# Patient Record
Sex: FEMALE | Race: WHITE | NOT HISPANIC OR LATINO | Employment: FULL TIME | URBAN - METROPOLITAN AREA
[De-identification: names, ages, dates, MRNs, and addresses within clinical notes are randomized per-mention and may not be internally consistent; named-entity substitution may affect disease eponyms.]

---

## 2017-10-09 ENCOUNTER — AMBULATORY - RIVER FALLS (OUTPATIENT)
Dept: FAMILY MEDICINE | Facility: CLINIC | Age: 37
End: 2017-10-09

## 2018-10-18 ENCOUNTER — OFFICE VISIT - RIVER FALLS (OUTPATIENT)
Dept: FAMILY MEDICINE | Facility: CLINIC | Age: 38
End: 2018-10-18

## 2018-10-18 ASSESSMENT — MIFFLIN-ST. JEOR: SCORE: 1599.38

## 2018-10-22 ENCOUNTER — TRANSFERRED RECORDS (OUTPATIENT)
Dept: HEALTH INFORMATION MANAGEMENT | Facility: CLINIC | Age: 38
End: 2018-10-22

## 2018-10-22 LAB — HPV ABSTRACT: NORMAL

## 2019-05-23 ENCOUNTER — COMMUNICATION - RIVER FALLS (OUTPATIENT)
Dept: FAMILY MEDICINE | Facility: CLINIC | Age: 39
End: 2019-05-23

## 2019-05-23 ENCOUNTER — AMBULATORY - RIVER FALLS (OUTPATIENT)
Dept: FAMILY MEDICINE | Facility: CLINIC | Age: 39
End: 2019-05-23

## 2019-05-28 LAB
GAMMA INTERFERON BACKGROUND BLD IA-ACNC: 0.06 IU/ML
HBV SURFACE AB SERPL IA-ACNC: 40 MIU/ML
M TB IFN-G BLD-IMP: NEGATIVE
M TB IFN-G CD4+ BCKGRND COR BLD-ACNC: 9.65 IU/ML
MITOGEN IGNF BCKGRD COR BLD-ACNC: 0 IU/ML
MITOGEN IGNF BCKGRD COR BLD-ACNC: <0 IU/ML
VZV IGG SER QL IA: 2196

## 2019-10-24 ENCOUNTER — AMBULATORY - RIVER FALLS (OUTPATIENT)
Dept: FAMILY MEDICINE | Facility: CLINIC | Age: 39
End: 2019-10-24

## 2020-04-16 ENCOUNTER — OFFICE VISIT - RIVER FALLS (OUTPATIENT)
Dept: FAMILY MEDICINE | Facility: CLINIC | Age: 40
End: 2020-04-16

## 2020-04-16 ASSESSMENT — MIFFLIN-ST. JEOR: SCORE: 1808.04

## 2020-07-21 ENCOUNTER — OFFICE VISIT - RIVER FALLS (OUTPATIENT)
Dept: FAMILY MEDICINE | Facility: CLINIC | Age: 40
End: 2020-07-21

## 2020-07-21 ASSESSMENT — MIFFLIN-ST. JEOR: SCORE: 1742.72

## 2022-01-09 ENCOUNTER — LAB REQUISITION (OUTPATIENT)
Dept: LAB | Facility: CLINIC | Age: 42
End: 2022-01-09
Payer: COMMERCIAL

## 2022-01-09 DIAGNOSIS — U07.1 COVID-19: ICD-10-CM

## 2022-01-09 PROCEDURE — U0003 INFECTIOUS AGENT DETECTION BY NUCLEIC ACID (DNA OR RNA); SEVERE ACUTE RESPIRATORY SYNDROME CORONAVIRUS 2 (SARS-COV-2) (CORONAVIRUS DISEASE [COVID-19]), AMPLIFIED PROBE TECHNIQUE, MAKING USE OF HIGH THROUGHPUT TECHNOLOGIES AS DESCRIBED BY CMS-2020-01-R: HCPCS | Mod: ORL | Performed by: FAMILY MEDICINE

## 2022-01-10 LAB
SARS-COV-2 RNA RESP QL NAA+PROBE: NEGATIVE
SARS-COV-2 RNA RESP QL NAA+PROBE: NEGATIVE

## 2022-01-12 ENCOUNTER — LAB REQUISITION (OUTPATIENT)
Dept: LAB | Facility: CLINIC | Age: 42
End: 2022-01-12
Payer: COMMERCIAL

## 2022-01-12 DIAGNOSIS — U07.1 COVID-19: ICD-10-CM

## 2022-01-12 PROCEDURE — U0005 INFEC AGEN DETEC AMPLI PROBE: HCPCS | Mod: ORL | Performed by: FAMILY MEDICINE

## 2022-01-13 LAB — SARS-COV-2 RNA RESP QL NAA+PROBE: POSITIVE

## 2022-01-18 LAB — SARS-COV-2 RNA RESP QL NAA+PROBE: POSITIVE

## 2022-02-11 VITALS
BODY MASS INDEX: 38.42 KG/M2 | TEMPERATURE: 97.8 F | WEIGHT: 244.8 LBS | HEART RATE: 76 BPM | DIASTOLIC BLOOD PRESSURE: 80 MMHG | SYSTOLIC BLOOD PRESSURE: 110 MMHG | HEIGHT: 67 IN

## 2022-02-12 VITALS
HEIGHT: 67 IN | WEIGHT: 230.4 LBS | BODY MASS INDEX: 36.16 KG/M2 | TEMPERATURE: 98.4 F | DIASTOLIC BLOOD PRESSURE: 70 MMHG | OXYGEN SATURATION: 98 % | HEART RATE: 81 BPM | SYSTOLIC BLOOD PRESSURE: 104 MMHG

## 2022-02-12 VITALS
HEART RATE: 76 BPM | HEIGHT: 67 IN | DIASTOLIC BLOOD PRESSURE: 62 MMHG | BODY MASS INDEX: 31.2 KG/M2 | TEMPERATURE: 98.7 F | SYSTOLIC BLOOD PRESSURE: 102 MMHG | WEIGHT: 198.8 LBS

## 2022-02-16 NOTE — NURSING NOTE
Depression Screening Entered On:  7/22/2020 3:10 PM CDT    Performed On:  7/21/2020 3:09 PM CDT by Niki COBB, Marixa               Depression Screening   Little Interest - Pleasure in Activities :   Not at all   Feeling Down, Depressed, Hopeless :   Not at all   Initial Depression Screen Score :   0 Score   Poor Appetite or Overeating :   Several days   Trouble Falling or Staying Asleep :   Not at all   Feeling Tired or Little Energy :   Several days   Feeling Bad About Yourself :   Not at all   Trouble Concentrating :   Not at all   Moving or Speaking Slowly :   Not at all   Thoughts Better Off Dead or Hurting Self :   Not at all   GRAHAM Difficulty with Work, Home, Others :   Not difficult at all   Detailed Depression Screen Score :   2    Total Depression Screen Score :   2    Marixa Prince MA - 7/22/2020 3:09 PM CDT

## 2022-02-16 NOTE — NURSING NOTE
CAGE Assessment Entered On:  7/22/2020 3:09 PM CDT    Performed On:  7/21/2020 3:09 PM CDT by Marixa Prince MA               Assessment   Have you ever felt you should cut down on your drinking :   No   Have people annoyed you by criticizing your drinking :   No   Have you ever felt bad or guilty about your drinking :   No   Have you ever taken a drink first thing in the morning to steady your nerves or get rid of a hangover (Eye-opener) :   No   CAGE Score :   0    Marixa Prince MA - 7/22/2020 3:09 PM CDT

## 2022-02-16 NOTE — PROGRESS NOTES
Chief Complaint    f/u wt loss, phentermine.  History of Present Illness      Patient is here for follow-up on weight loss.  She has been on phentermine for the last couple of months.  She has lost about 15 pounds.  He has had some trouble recently with plateauing of the weight.  She currently is on 30 mg daily.  Her appetite has been fairly well suppressed.  She admits that her eating habits are not as good as he should be.  Denies any side effects from medication.  Blood pressure is been normal.  Review of Systems      See HPI.  All other review of systems negative.  Physical Exam   Vitals & Measurements    T: 98.4   F (Tympanic)  HR: 81(Peripheral)  BP: 104/70  SpO2: 98%     HT: 67 in  WT: 230.4 lb  BMI: 36.08       Alert, oriented, no acute distress       Normal heart rate       Nonlabored breathing       Cooperative, appropriate affect  Assessment/Plan       Obesity (Probable) (E66.9)         Encourage visit with dietitian.  Discussed portion size, appropriate eating habits, appetite management, and future oral medication.  She will stay on her current dose of phentermine and report back in about 1 month regarding her weight.  She is unable to continue losing weight consider changing medication or discontinuing the medication.       Orders:         phentermine, = 1 cap(s) ( 30 mg ), Oral, daily, # 30 cap(s), 0 Refill(s), Type: Maintenance, Pharmacy: [facility], 1 cap(s) Oral daily, 67, in, 07/21/20 15:49:00 CDT, Height Measured, 230.4, lb, 07/21/20 15:49:00 CDT, Weight Measured, (Ordered)  Patient Information     Name:CRISTHIAN MCMULLEN      Address:      [address] [place] [id number]     Sex:Female     YOB: 1980     [phone]     Emergency Contact:KLARISSA MCMULLEN     MRN:[id number]     FIN:[id number]     Location:[facility]     Date of Service:07/21/2020      Primary Care Physician:       Greg Rodriguez MD, [phone]      Attending Physician:       Greg Rodriguez MD, [phone]  Problem List/Past Medical History    Ongoing     Obesity    Historical     Chicken Pox     Pregnancy     Pregnancy     Pregnancy  Procedure/Surgical History     Exploratory Laparotomy (03/21/2013)      Comments: 1.  Retrieval of intra-abdominal IUD.      2.  Sigmoidoscopy - normal to 30 cm..     NVD (Normal Vaginal Delivery) (10/18/2011)      Comments: Female - 39w5d..     vaginal delivery x2      Comments: 2005,2008.  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 3-4 times/week Exercise frequency:. 21 day fix Exercise type:., 10/18/2018     Home/Environment      Marital status: . Spouse/Partner name: Ford. Lives with Children, Spouse., 03/07/2013     Substance Abuse      Never, 03/07/2013     Tobacco      Past, 03/07/2013  Family History    Asthma: Mother.    Sister: History is negative  Immunizations      Vaccine Date Status          influenza virus vaccine, inactivated 10/24/2019 Given          tetanus/diphth/pertuss (Tdap) adult/adol 10/18/2018 Given          influenza virus vaccine, inactivated 10/18/2018 Given          influenza virus vaccine, inactivated 10/09/2017 Given          influenza virus vaccine, inactivated 10/12/2016 Given          influenza virus vaccine, inactivated 10/13/2015 Given          influenza virus vaccine, inactivated 10/14/2014 Given          influenza virus vaccine, inactivated 10/08/2013 Given          influenza virus vaccine, inactivated 10/23/2012 Given          influenza virus vaccine, inactivated 09/20/2011 Given          influenza virus vaccine, inactivated  10/13/2010 Given          influenza virus vaccine, inactivated 09/14/2009 Recorded          influenza virus vaccine, inactivated 10/17/2008 Recorded          tetanus/diphth/pertuss (Tdap) adult/adol 02/21/2007 Recorded          tetanus/diphth/pertuss (Tdap) adult/adol 02/12/2007 Recorded          influenza virus vaccine, inactivated 10/19/2006 Recorded          influenza virus vaccine, inactivated 11/22/2005 Recorded          Hep B 02/21/2001 Recorded          MMR (measles/mumps/rubella) 08/17/2000 Recorded          Hep B 08/08/2000 Recorded          Hep B 06/27/2000 Recorded          MMR (measles/mumps/rubella) 04/04/1991 Recorded          MMR (measles/mumps/rubella) 10/11/1982 Recorded

## 2022-02-16 NOTE — TELEPHONE ENCOUNTER
---------------------  From: Niki COBB, Marixa ([employer] Pool ([id number]_[facility]))   To: Greg Rodriguez MD;     Sent: 10/21/2020 7:29:19 AM CDT  Subject: General Message     Pt ran out of phentermine last week.  Felt fine until yesterday--drowsy, dizzy/vertigo spells.  Pt is wondering if she should restart med or just stop since she already has been without med x4days.  She was taking 15mg bid but would forget the PM dose.  If she is to continue, please send in 30mg daily dose.  Please advise if ok to be off med or resume.---------------------  From: Greg Rodriguez MD   To: CRISTHIAN MCMULLEN    Sent: 10/21/2020 3:51:22 PM CDT  Subject: RE: General Message     OK to stop the medication especially if no longer losing weight.  If weight loss has continued can continue at 30 mg qd.

## 2022-02-16 NOTE — TELEPHONE ENCOUNTER
---------------------  From: Niki COBB, Marixa ([employer] Message Pool ([id number]_[facility]))   To: Greg Rodriguez MD;     Sent: 5/13/2020 10:29:31 AM CDT  Subject: General Message     Pt wanting to give update on new medication.  Is doing well on med, lost about 12lbs from last visit.  Wt now 232lbs.  She does check wt daily and logging on her calendar.  Wondering if she should be on the same dose or have it adjusted, is due for refill by Friday.  Please advise.  ** Submitted: **  Order:phentermine (phentermine 15 mg oral capsule)  1 cap(s)  Oral  daily  Qty:  30 cap(s)        Duration:  30 day(s)        Refills:  0          Substitutions Allowed     Route To Pharmacy - [facility]    Signed by Greg Rodriguez MD  5/14/2020 4:32:00 PM

## 2022-02-16 NOTE — PROCEDURES
Accession Number:       [id number]  CLINICAL INFORMATION::     None given  LMP::     09/22/2018  PREV. PAP::     05/2016  PREV. BX::     NONE GIVEN  SOURCE::     Cervix, Endocervix  STATEMENT OF ADEQUACY::     Satisfactory for evaluation. Endocervical/transformation zone component present.  INTERPRETATION/RESULT::     Negative for intraepithelial lesion or malignancy.  COMMENT::     This Pap test has been evaluated with computer assisted technology.  CYTOTECHNOLOGIST::     DIMAS DEE(ASCP) CT Screening location: [place] [address]  COMMENT:     See comment       EXPLANATORY NOTE:         The Pap is a screening test for cervical cancer. It is       not a diagnostic test and is subject to false negative       and false positive results. It is most reliable when a       satisfactory sample, regularly obtained, is submitted       with relevant clinical findings and history, and when       the Pap result is evaluated along with historic and       current clinical information.  HPV mRNA E6/E7:     Not Detected       This test was performed using the APTIMA HPV Assay ([employer]       This assay detects E6/E7 viral messenger RNA (mRNA) from 14       high-risk HPV types (16,18,31,33,35,39,45,51,52,56,58,59,66,68).         The analytical performance characteristics of       this assay have been determined by [employer]. The modifications have not been       cleared or approved by the FDA. This assay has       been validated pursuant to the CLIA regulations       and is used for clinical purposes.

## 2022-02-16 NOTE — NURSING NOTE
Comprehensive Intake Entered On:  4/16/2020 3:06 PM CDT    Performed On:  4/16/2020 3:01 PM CDT by Niki COBB, Marixa               Summary   Chief Complaint :   discuss wt issues, feels more bloated, increased fatigue.   Menstrual Status :   Menarcheal   Weight Measured :   244.8 lb(Converted to: 244 lb 13 oz, 111.04 kg)    Height Measured :   67 in(Converted to: 5 ft 7 in, 170.18 cm)    Body Mass Index :   38.34 kg/m2 (HI)    Body Surface Area :   2.29 m2   Systolic Blood Pressure :   110 mmHg   Diastolic Blood Pressure :   80 mmHg   Mean Arterial Pressure :   90 mmHg   Peripheral Pulse Rate :   76 bpm   BP Site :   Right arm   Pulse Site :   Radial artery   BP Method :   Manual   HR Method :   Manual   Temperature Tympanic :   97.8 DegF(Converted to: 36.6 DegC)  (LOW)    Marixa Prince MA - 4/16/2020 3:01 PM CDT   Health Status   Allergies Verified? :   Yes   Medication History Verified? :   Yes   Medical History Verified? :   Yes   Pre-Visit Planning Status :   Completed   Tobacco Use? :   Former smoker   Marixa Prince MA - 4/16/2020 3:01 PM CDT   Consents   Consent for Immunization Exchange :   Consent Granted   Consent for Immunizations to Providers :   Consent Granted   Marixa Prince MA - 4/16/2020 3:01 PM CDT   Meds / Allergies   (As Of: 4/16/2020 3:06:24 PM CDT)   Allergies (Active)   No Known Medication Allergies  Estimated Onset Date:   Unspecified ; Created By:   Cory Jenkins CMA; Reaction Status:   Active ; Category:   Drug ; Substance:   No Known Medication Allergies ; Type:   Allergy ; Updated By:   Cory Jenkins CMA; Reviewed Date:   11/28/2016 10:39 AM CST        Medication List   (As Of: 4/16/2020 3:06:24 PM CDT)   No Known Home Medications     Marixa Prince MA - 4/16/2020 3:04:32 PM      Prescription/Discharge Order    Gildess FE 1/20 oral tablet  :   Gildess FE 1/20 oral tablet ; Status:   Completed ; Ordered As Mnemonic:   Gildess FE 1/20 oral tablet ; Simple Display Line:   See  Instructions, TAKE ONE TABLET BY MOUTH ONE TIME DAILY, 84 tab(s), 2 Refill(s) ; Ordering Provider:   Greg Rodriguez MD; Catalog Code:   ethinyl estradiol-norethindrone ; Order Dt/Tm:   7/12/2016 1:52:37 PM CDT            Social History   Social History   (As Of: 4/16/2020 3:06:24 PM CDT)   Alcohol:        Current, 1-2 times per month   (Last Updated: 3/7/2013 11:25:05 AM CST by Marixa Prince MA)          Tobacco:        Past   (Last Updated: 3/7/2013 11:25:14 AM CST by Marixa Prince MA)          Substance Abuse:        Never   (Last Updated: 3/7/2013 11:25:11 AM CST by Marixa Prince MA)          Employment/School:        Employed, 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   (Last Updated: 3/7/2013 11:25:45 AM CST by Marixa Prince MA)          Home/Environment:        Marital status: .  Spouse/Partner name: Ford.  Lives with Children, Spouse.   (Last Updated: 3/7/2013 11:25:28 AM CST by Marixa Prince MA)          Exercise:        30 Exercise duration:.  3-4 times/week Exercise frequency:.  21 day fix Exercise type:.   (Last Updated: 10/18/2018 5:07:40 PM UTC by Marixa Prince MA)

## 2022-02-16 NOTE — TELEPHONE ENCOUNTER
---------------------  From: Niki COBB, Marixa ([facility] Message Pool ([id number]_[facility]))   To: Greg Rodriguez MD;     Sent: 8/19/2020 2:41:03 PM CDT  Subject: Phentermine refill     Phone Message    PCP:   ELLEN      Time of Call:  1400       Person Calling:  pt  Phone number:  [phone]    Reason for call:  pt requesting Phentermine refill.  Is doing well on current dose, feeling full faster.  Is wondering if she should continue current dose, 30mg qAM or try 15mg bid to see if it helps w/ evening meal.  Current wt:  222.1lbs.  Returned call at: _    Note:   _    Last office visit and reason:  _    Transferred to: _  ** Submitted: **  Order:phentermine (phentermine 15 mg oral capsule)  1 cap(s)  Oral  bid  Qty:  60 cap(s)        Duration:  30 day(s)        Refills:  0          Substitutions Allowed     Route To Pharmacy - [facility]    Signed by Greg Rodriguez MD  8/19/2020 9:31:00 PM [facility].  She has a total of 22 lbs since starting therapy.  Change to 15 mg bid to help with appetite control in evening.

## 2022-02-16 NOTE — PROGRESS NOTES
Chief Complaint    discuss wt issues, feels more bloated, increased fatigue.  History of Present Illness      Patient is here to discuss weight issues.  Over the last 18 months he has gained about 40 pounds.  She is considering medication to help her with weight loss.  Her biggest challenges with regard to her weight are intermittent eating.  Her family is busy with sports and they often eat while they are out.  They go to fast food restaurants fairly often.  She finds her willpower did not eat is very poor.  She is becoming quite discouraged.       She has participated in boot camp and beach body exercise regimens without a lot of success with weight loss.  She has tried intermittent fasting and did not do well with this.  She describes symptoms consistent with hypoglycemia.  She is also tried a low-carb diet and keto diet with out much success.  She has used weight watchers patrick and my fitness pal patrick in the past with some success.       She has not had any troubles with hypertension.  Her menses are normal.  She did not have gestational diabetes with her pregnancies.  Review of Systems      Constitutional: No fever, No chills, No sweats, No weakness, No fatigue.      Eye: No recent visual problem.      Ear/Nose/Mouth/Throat: No decreased hearing, No nasal congestion, No sore throat.      Respiratory: No shortness of breath, No cough.      Cardiovascular: No chest pain, No palpitations, No peripheral edema.      Gastrointestinal: No nausea, No vomiting, No diarrhea, No constipation, No heartburn.      Genitourinary: No dysuria, No change in urine stream.      Hematology/Lymphatics: No bruising tendency, No bleeding tendency.      Endocrine: No cold intolerance, No heat intolerance.      Musculoskeletal: No back pain, No neck pain, No joint pain, No muscle pain.      Integumentary: No rash.      Neurologic: Alert and oriented X4, No headache.      Psychiatric: No anxiety, No depression.      All other systems were  reviewed and are negative.  Physical Exam   Vitals & Measurements    T: 97.8   F (Tympanic)  HR: 76(Peripheral)  BP: 110/80     HT: 67 in  WT: 244.8 lb  BMI: 38.34       General: Alert and oriented, No acute distress.      Eye: Pupils are equal, round and reactive to light, Normal conjunctiva.      HENT: Oral mucosa is moist.      Neck: Supple.      Respiratory: Respirations are non-labored.      Cardiovascular: Normal rate, Regular rhythm, No edema.      Gastrointestinal: Non-distended.      Musculoskeletal: Normal gait.      Integumentary: Warm, No rash.      Psychiatric: Cooperative, Appropriate mood & affect, Normal judgment  Assessment/Plan       Obesity (Probable) (E66.9)        We have discussed various weight loss plans.  Discussed the importance of proper diet and weight loss.  Calorie restriction and appetite management were discussed.  She will start on phentermine 15 mg daily.  Recheck in 1 month.  We have set a goal of 25 pounds over the next several months.  Side effects medication discussed.  Type and dosing discussed as well.  Follow-up in 1 month.         30 minutes spent with the patient majority in counseling discussion and care coordination       Orders:         ethinyl estradiol-norethindrone, See Instructions, Instructions: TAKE ONE TABLET BY MOUTH ONE TIME DAILY, # 84 tab(s), 2 Refill(s), Type: Soft Stop, Pharmacy: [id number], TAKE ONE TABLET BY MOUTH ONE TIME DAILY, (Completed)  Patient Information     Name:CRISTHIAN MCMULLEN      Address:      [address] [place] [id number]     Sex:Female     YOB: 1980     [phone]     Emergency Contact:KLARISSA MCMULLEN     MRN:[id number]     FIN:[id number]     Location:[facility]     Date of Service:04/16/2020      Primary Care Physician:       Greg Rodriguez MD, [phone]      Attending Physician:       Greg Rodriguez MD, [phone]  Problem List/Past Medical History     Ongoing     Obesity    Historical     Chicken Pox     Pregnancy     Pregnancy     Pregnancy  Procedure/Surgical History     Exploratory Laparotomy (03/21/2013)      Comments: 1.  Retrieval of intra-abdominal IUD.      2.  Sigmoidoscopy - normal to 30 cm..     NVD (Normal Vaginal Delivery) (10/18/2011)      Comments: Female - 39w5d..     vaginal delivery x2      Comments: 2005,2008.  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 3-4 times/week Exercise frequency:. 21 day fix Exercise type:., 10/18/2018     Home/Environment      Marital status: . Spouse/Partner name: Ford. Lives with Children, Spouse., 03/07/2013     Substance Abuse      Never, 03/07/2013     Tobacco      Past, 03/07/2013  Family History    Asthma: Mother.    Sister: History is negative  Immunizations      Vaccine Date Status          influenza virus vaccine, inactivated 10/24/2019 Given          tetanus/diphth/pertuss (Tdap) adult/adol 10/18/2018 Given          influenza virus vaccine, inactivated 10/18/2018 Given          influenza virus vaccine, inactivated 10/09/2017 Given          influenza virus vaccine, inactivated 10/12/2016 Given          influenza virus vaccine, inactivated 10/13/2015 Given          influenza virus vaccine, inactivated 10/14/2014 Given          influenza virus vaccine, inactivated 10/08/2013 Given          influenza virus vaccine, inactivated 10/23/2012 Given          influenza virus vaccine, inactivated 09/20/2011 Given          influenza virus vaccine, inactivated 10/13/2010 Given          influenza virus vaccine, inactivated 09/14/2009 Recorded          influenza virus vaccine, inactivated 10/17/2008 Recorded           tetanus/diphth/pertuss (Tdap) adult/adol 02/21/2007 Recorded          tetanus/diphth/pertuss (Tdap) adult/adol 02/12/2007 Recorded          influenza virus vaccine, inactivated 10/19/2006 Recorded          influenza virus vaccine, inactivated 11/22/2005 Recorded          Hep B 02/21/2001 Recorded          MMR (measles/mumps/rubella) 08/17/2000 Recorded          Hep B 08/08/2000 Recorded          Hep B 06/27/2000 Recorded          MMR (measles/mumps/rubella) 04/04/1991 Recorded          MMR (measles/mumps/rubella) 10/11/1982 Recorded

## 2022-02-16 NOTE — TELEPHONE ENCOUNTER
---------------------  From: Niki COBB, Marixa ([employer] Pool ([id number]_[facility]))   To: Greg Rodriguez MD;     Sent: 6/11/2020 8:00:31 AM CDT  Subject: Med concerns--Phentermine     Pt is needing refill of Phentermine by Thursday.  Wt today:  232lbs, only down 10lbs over the past 2 months, no changes.  She isn't feeling any effects from med, is still feeling hungry on current dose.  Wondering if dose should be increased.  Please advise.  ** Submitted: **  Order:phentermine (phentermine 30 mg oral capsule)  1 cap(s)  Oral  daily  Qty:  30 cap(s)        Duration:  30 day(s)        Refills:  0          Substitutions Allowed     Route To Pharmacy - [facility]    Signed by Greg Rodriguez MD  6/11/2020 1:35:00 PM---------------------  From: Greg Rodriguez MD   To: [employer] Pool ([id number]_[place] [facility] [place]);     Sent: 6/11/2020 8:37:15 AM CDT  Subject: RE: Med concerns--Phentermine     Phentermine increased to 30 mg a day.  Follow up in one monthPt informed.

## 2022-02-16 NOTE — TELEPHONE ENCOUNTER
---------------------  From: Carli Nunez CMA   Sent: 11/17/2020 1:13:09 PM CST  Subject: COVID results     COVID19 negative per state testing done on 11/11/2020, pt was notified.

## 2022-02-16 NOTE — NURSING NOTE
Comprehensive Intake Entered On:  7/21/2020 3:55 PM CDT    Performed On:  7/21/2020 3:49 PM CDT by Niki COBB, Marixa               Summary   Chief Complaint :   f/u wt loss, phentermine.   Menstrual Status :   Menarcheal   Weight Measured :   230.4 lb(Converted to: 230 lb 6 oz, 104.51 kg)    Height Measured :   67 in(Converted to: 5 ft 7 in, 170.18 cm)    Body Mass Index :   36.08 kg/m2 (HI)    Body Surface Area :   2.22 m2   Systolic Blood Pressure :   104 mmHg   Diastolic Blood Pressure :   70 mmHg   Mean Arterial Pressure :   81 mmHg   Peripheral Pulse Rate :   81 bpm   BP Site :   Right arm   Pulse Site :   Radial artery   BP Method :   Manual   HR Method :   Manual   Temperature Tympanic :   98.4 DegF(Converted to: 36.9 DegC)    Oxygen Saturation :   98 %   Marixa Prince MA - 7/21/2020 3:49 PM CDT   Health Status   Allergies Verified? :   Yes   Medication History Verified? :   Yes   Medical History Verified? :   Yes   Pre-Visit Planning Status :   Completed   Tobacco Use? :   Former smoker   Marixa Prince MA - 7/21/2020 3:49 PM CDT   Consents   Consent for Immunization Exchange :   Consent Granted   Consent for Immunizations to Providers :   Consent Granted   Marixa Prince MA - 7/21/2020 3:49 PM CDT   Meds / Allergies   (As Of: 7/21/2020 3:55:20 PM CDT)   Allergies (Active)   No Known Medication Allergies  Estimated Onset Date:   Unspecified ; Created By:   Cory Jenkins CMA; Reaction Status:   Active ; Category:   Drug ; Substance:   No Known Medication Allergies ; Type:   Allergy ; Updated By:   Cory Jenkins CMA; Reviewed Date:   11/28/2016 10:39 AM CST        Medication List   (As Of: 7/21/2020 3:55:20 PM CDT)   Home Meds    phentermine  :   phentermine ; Status:   Documented ; Ordered As Mnemonic:   phentermine 30 mg oral capsule ; Simple Display Line:   30 mg, 1 cap(s), Oral, daily, 0 Refill(s) ; Catalog Code:   phentermine ; Order Dt/Tm:   7/21/2020 11:31:58 AM CDT            ID Risk  Screen   Recent Travel History :   No recent travel   Family Member Travel History :   No recent travel   Other Exposure to Infectious Disease :   Unknown   Marixa Prince MA - 7/21/2020 3:49 PM CDT   Social History   Social History   (As Of: 7/21/2020 3:55:20 PM CDT)   Alcohol:        Current, 1-2 times per month   (Last Updated: 3/7/2013 11:25:05 AM CST by Marixa Prince MA)          Tobacco:        Past   (Last Updated: 3/7/2013 11:25:14 AM CST by Marixa Prince MA)          Substance Abuse:        Never   (Last Updated: 3/7/2013 11:25:11 AM CST by Marixa Prince MA)          Employment/School:        Employed, 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   (Last Updated: 3/7/2013 11:25:45 AM CST by Marixa Prince MA)          Home/Environment:        Marital status: .  Spouse/Partner name: Ford.  Lives with Children, Spouse.   (Last Updated: 3/7/2013 11:25:28 AM CST by Marixa Prince MA)          Exercise:        30 Exercise duration:.  3-4 times/week Exercise frequency:.  21 day fix Exercise type:.   (Last Updated: 10/18/2018 5:07:40 PM UTC by Marixa Prince MA)

## 2022-02-16 NOTE — PROGRESS NOTES
Chief Complaint    annual px, discuss wt gain, lack of energy over the past yr. flu shot also.  History of Present Illness       Patient is here for her annual pap/px.  She has concerns about her increased weight gain and retaining fluid over the past year.  She has gained over 20lbs over the past year, stopped taking OCP as well.  She is limiting in intake of high calorie soft drinks, drinking more water.  She is also watching her diet, trying not to eat too many sweets.  Her menses are heavier but still lasts 7 days.  She also c/o having a foul odor during her menses.         Weight one year ago 175lbs.  Today s weight 198.8 lbs.          Last Pap:  May 2014---due          Hx of abnormal paps:  no             Most recent mammogram:  n/a          Colon cancer screening status:  n/a          Dexa scan:  n/a             Last Dental Exam:  UTD          Last Eye Exam: no vision issues          Immunizations:  Needs flu shot. and Tdap today          Screening labs:  UTD   Review of Systems      Constitutional:  No fever, No chills, No sweats, No weakness, No fatigue.            Eye:  No recent visual problem.            Ear/Nose/Mouth/Throat:  No decreased hearing, No nasal congestion, No sore throat.            Respiratory:  No shortness of breath, No cough.            Cardiovascular:  No chest pain, No palpitations, No peripheral edema.            Gastrointestinal:  No nausea, No vomiting, No diarrhea, No constipation, No heartburn.            Genitourinary:  No dysuria, No change in urine stream.            Hematology/Lymphatics:  No bruising tendency, No bleeding tendency.            Endocrine:  No cold intolerance, No heat intolerance.            Musculoskeletal:  No back pain, No neck pain, No joint pain, No muscle pain.            Integumentary:  No rash.            Neurologic:  Alert and oriented X4, No headache.            Psychiatric:  No anxiety, No depression.            All other systems were reviewed and  are negative         Physical Exam   Vitals & Measurements    T: 98.7   F (Tympanic)  HR: 76(Peripheral)  BP: 102/62     HT: 67 in  WT: 198.8 lb  BMI: 31.13       General: Alert and oriented, No acute distress.      Eye: Pupils are equal, round and reactive to light, Extraocular movements are intact, Normal conjunctiva.      HENT: Normocephalic, Tympanic membranes are clear, Oral mucosa is moist, No pharyngeal erythema, No sinus tenderness.      Neck: Supple, Non-tender, No carotid bruit, No lymphadenopathy, No thyromegaly.      Respiratory: Lungs are clear to auscultation, Respirations are non-labored, Breath sounds are equal, No chest wall tenderness.      Cardiovascular: Normal rate, Regular rhythm, No murmur, No gallop, Normal peripheral perfusion, No edema.      Breast: No mass, No tenderness, No discharge.      Gastrointestinal: Soft, Non-tender, Normal bowel sounds, No organomegaly.      Genitourinary: No costovertebral angle tenderness.      Labia: Within normal limits.      Urethra: Within normal limits.      Vagina: white discharge      Cervix: parous, no lesions or discharge      Musculoskeletal: Normal range of motion, Normal strength, No swelling, No deformity.      Integumentary: Warm, Dry, No rash, No new skin moles.      Neurologic: Alert, Oriented, Normal sensory, Normal motor function, No focal deficits, Normal deep tendon reflexes.      Psychiatric: Cooperative, Appropriate mood & affect      wet prep is negative  Assessment/Plan       1. Encounter for well woman exam with routine gynecological exam (Z01.419)        Patient will continue to watch diet and maintain healthy eating habits, increase activity/exercise level.  Thin prep is done today.  If normal, she will repeat pap in 5 yrs.  RTC in 1yr for physical.  Flu shot and Tdap given today.       2. Heavy menses (N92.0)         Will order Hgb to check for anemia as well as cause of fatigue and a TSH to see if she is having issues with her  thyroid that could explain why she is having heavier than normal menses as well as fatigue, weight gain.       3. Vaginal odor (N89.8)         Wet prep to be done to r/o possible vaginal infection.      I, Marixa Prince MA, acted solely as a scribe for, and in the presence of Dr. Greg Rodriguez who performed the services.             I, Greg Rodriguez MD, personally performed the services described in this documentation.  The documentation was scribed in my presence and is both accurate and complete.                Patient Information     Name:CRISTHIAN MCMULLEN      Address:      [address] [place] [id number]     Sex:Female     YOB: 1980     [phone]     Emergency Contact:KLARISSA MCMULLEN     MRN:[id number]     FIN:[id number]     Location:[facility]     Date of Service:10/18/2018      Primary Care Physician:       Greg Rodriguez MD, [phone]      Attending Physician:       Greg Rodriguez MD, [phone]  Problem List/Past Medical History    Ongoing     Obesity    Historical     Chicken Pox     Pregnancy     Pregnancy     Pregnancy  Procedure/Surgical History     Exploratory Laparotomy (03/21/2013)            Comments:      1.  Retrieval of intra-abdominal IUD.      2.  Sigmoidoscopy - normal to 30 cm.     NVD (Normal Vaginal Delivery) (10/18/2011)            Comments:      Female - 39w5d.     vaginal delivery x2            Comments:      2005,2008  Medications     Gildess FE 1/20 oral tablet: See Instructions, TAKE ONE TABLET BY MOUTH ONE TIME DAILY, 84 tab(s), 2 Refill(s).          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 3-4 times/week Exercise  frequency:. 21 day fix Exercise type:., 10/18/2018     Home and Environment      Marital status: . Spouse/Partner name: Ford. Lives with Children, Spouse., 03/07/2013     Substance Abuse      Never, 03/07/2013     Tobacco      Past, 03/07/2013  Family History    Asthma: Mother.    Sister: History is negative  Immunizations      Vaccine Date Status Comments      influenza virus vaccine, inactivated 10/09/2017 Given      influenza virus vaccine, inactivated 10/12/2016 Given      influenza virus vaccine, inactivated 10/13/2015 Given      influenza virus vaccine, inactivated 10/14/2014 Given      influenza virus vaccine, inactivated 10/08/2013 Given      influenza virus vaccine, inactivated 10/23/2012 Given      influenza virus vaccine, inactivated 09/20/2011 Given      influenza virus vaccine, inactivated 10/13/2010 Given      tetanus/diphth/pertuss (Tdap) adult/adol 02/21/2007 Recorded      tetanus/diphth/pertuss (Tdap) adult/adol 02/12/2007 Recorded      Hep B 02/21/2001 Recorded      MMR (measles/mumps/rubella) 08/17/2000 Recorded      Hep B 08/08/2000 Recorded      Hep B 06/27/2000 Recorded      MMR (measles/mumps/rubella) 04/04/1991 Recorded      MMR (measles/mumps/rubella) 10/11/1982 Recorded  Lab Results       Lab Results (Last 4 results within 90 days)        Wet Prep Yeast: None Seen (10/18/18 13:23:00 CDT)       Wet Prep Trichomonas: None Seen (10/18/18 13:23:00 CDT)       Wet Prep Clue Cells: None Seen (10/18/18 13:23:00 CDT)

## 2022-07-24 ENCOUNTER — HEALTH MAINTENANCE LETTER (OUTPATIENT)
Age: 42
End: 2022-07-24

## 2022-10-03 ENCOUNTER — HEALTH MAINTENANCE LETTER (OUTPATIENT)
Age: 42
End: 2022-10-03

## 2023-03-06 ENCOUNTER — OFFICE VISIT (OUTPATIENT)
Dept: FAMILY MEDICINE | Facility: CLINIC | Age: 43
End: 2023-03-06

## 2023-03-06 VITALS
SYSTOLIC BLOOD PRESSURE: 112 MMHG | OXYGEN SATURATION: 96 % | BODY MASS INDEX: 36.09 KG/M2 | HEIGHT: 67 IN | DIASTOLIC BLOOD PRESSURE: 73 MMHG | TEMPERATURE: 98.1 F | HEART RATE: 90 BPM

## 2023-03-06 DIAGNOSIS — J20.9 ACUTE BRONCHITIS WITH SYMPTOMS > 10 DAYS: Primary | ICD-10-CM

## 2023-03-06 PROCEDURE — 99213 OFFICE O/P EST LOW 20 MIN: CPT | Performed by: FAMILY MEDICINE

## 2023-03-06 RX ORDER — BENZONATATE 100 MG/1
100 CAPSULE ORAL 3 TIMES DAILY PRN
Qty: 30 CAPSULE | Refills: 0 | Status: SHIPPED | OUTPATIENT
Start: 2023-03-06

## 2023-03-06 RX ORDER — AZITHROMYCIN 250 MG/1
TABLET, FILM COATED ORAL
Qty: 6 TABLET | Refills: 0 | Status: SHIPPED | OUTPATIENT
Start: 2023-03-06 | End: 2023-03-11

## 2023-03-06 NOTE — PROGRESS NOTES
"Clinical Decision Making:    At the end of the encounter, I discussed results, diagnosis, medications. Discussed red flags for immediate return to clinic/ER, as well as indications for follow up if no improvement. Patient understood and agreed to plan. Patient was stable for discharge.      ICD-10-CM    1. Acute bronchitis with symptoms > 10 days  J20.9 azithromycin (ZITHROMAX) 250 MG tablet     benzonatate (TESSALON) 100 MG capsule        Treating with azithromycin (Z-Beto)  Tessalon Perles every 8 hours as needed  Follow-up if not improving as anticipated      There are no Patient Instructions on file for this visit.   No follow-ups on file.      chief complaint    HPI:  Carmella Alvarez is a 43 year old female who presents today complaining of ongoing cough for a month.  She has been ill the whole time and has not had any periods of wellness.  The last 2 weeks the cough has been even worse and she is getting some shortness of breath especially with going upstairs.  It is a productive cough.  She has a headache as well.  No sore throat, sinus pain or tooth pain.  She has tested negative for COVID at home multiple times.  She has not noticed any wheezing  The cough is productive.  Positive voice hoarseness.  No fevers or chills.    History obtained from the patient.    Problem List:  There are no relevant problems documented for this patient.      History reviewed. No pertinent past medical history.    Social History     Tobacco Use     Smoking status: Not on file     Smokeless tobacco: Not on file   Substance Use Topics     Alcohol use: Not on file       Review of systems  negative except listed in HPI    Vitals:    03/06/23 1248   BP: 112/73   BP Location: Right arm   Patient Position: Sitting   Pulse: 90   Temp: 98.1  F (36.7  C)   SpO2: 96%   Height: 1.702 m (5' 7\")       Physical Exam  Vitals noted and within normal limits.  Patient is alert, oriented, and in no acute distress.  Eyes: Conjunctive not " injected.  Ears: Canals patent, TMs intact, no erythema and no bulging.  Mouth: Mucous membranes pink and moist.  Pharynx is not erythematous.  Neck supple with no cervical lymphadenopathy.  Heart has a regular rate and rhythm with no murmurs.  Lungs are clear to auscultation bilaterally with good air entry.  No wheezes, rales, rhonchi.

## 2023-08-13 ENCOUNTER — HEALTH MAINTENANCE LETTER (OUTPATIENT)
Age: 43
End: 2023-08-13

## 2024-03-10 ENCOUNTER — HEALTH MAINTENANCE LETTER (OUTPATIENT)
Age: 44
End: 2024-03-10

## 2024-10-05 ENCOUNTER — HEALTH MAINTENANCE LETTER (OUTPATIENT)
Age: 44
End: 2024-10-05

## 2024-10-07 ENCOUNTER — ANCILLARY PROCEDURE (OUTPATIENT)
Dept: MAMMOGRAPHY | Facility: CLINIC | Age: 44
End: 2024-10-07
Payer: COMMERCIAL

## 2024-10-07 DIAGNOSIS — Z12.31 VISIT FOR SCREENING MAMMOGRAM: ICD-10-CM

## 2024-10-07 PROCEDURE — 77063 BREAST TOMOSYNTHESIS BI: CPT | Mod: TC | Performed by: RADIOLOGY

## 2024-10-07 PROCEDURE — 77067 SCR MAMMO BI INCL CAD: CPT | Mod: TC | Performed by: RADIOLOGY

## 2024-11-13 ENCOUNTER — OFFICE VISIT (OUTPATIENT)
Dept: FAMILY MEDICINE | Facility: CLINIC | Age: 44
End: 2024-11-13
Payer: COMMERCIAL

## 2024-11-13 VITALS
HEART RATE: 83 BPM | RESPIRATION RATE: 16 BRPM | TEMPERATURE: 98.5 F | BODY MASS INDEX: 40.05 KG/M2 | WEIGHT: 264.3 LBS | HEIGHT: 68 IN | DIASTOLIC BLOOD PRESSURE: 54 MMHG | SYSTOLIC BLOOD PRESSURE: 111 MMHG | OXYGEN SATURATION: 97 %

## 2024-11-13 DIAGNOSIS — E66.09 OBESITY DUE TO EXCESS CALORIES WITH SERIOUS COMORBIDITY, UNSPECIFIED CLASS: Primary | Chronic | ICD-10-CM

## 2024-11-13 PROCEDURE — 99213 OFFICE O/P EST LOW 20 MIN: CPT | Performed by: FAMILY MEDICINE

## 2024-11-13 RX ORDER — LACTOBACILLUS RHAMNOSUS GG 10B CELL
CAPSULE ORAL
COMMUNITY
Start: 2024-10-19

## 2024-11-13 NOTE — PROGRESS NOTES
"  Assessment & Plan     Obesity due to excess calories with serious comorbidity, unspecified class    Patient restarted on semaglutide.  We have discussed side effects medication.  She will continue contact with nutrition.  Follow-up in 1 month.  - Semaglutide-Weight Management (WEGOVY) 0.25 MG/0.5ML pen; Inject 0.25 mg subcutaneously once a week.  - Semaglutide-Weight Management (WEGOVY) 0.5 MG/0.5ML pen; Inject 0.5 mg subcutaneously once a week.  - Semaglutide-Weight Management (WEGOVY) 1 MG/0.5ML pen; Inject 1 mg subcutaneously once a week.          BMI  Estimated body mass index is 40.78 kg/m  as calculated from the following:    Height as of this encounter: 1.715 m (5' 7.5\").    Weight as of this encounter: 119.9 kg (264 lb 4.8 oz).             Allen Weir is a 44 year old, presenting for the following health issues:  Weight Problem (Discuss wt loss medication)        11/13/2024     1:22 PM   Additional Questions   Roomed by Marixa QUIÑONES CMA   Accompanied by Self     History of Present Illness       Reason for visit:  Weight    She eats 0-1 servings of fruits and vegetables daily.She consumes 1 sweetened beverage(s) daily.She exercises with enough effort to increase her heart rate 20 to 29 minutes per day.  She exercises with enough effort to increase her heart rate 3 or less days per week.   She is taking medications regularly.       Patient is here to discuss starting weight loss medication.   Her weight has been steadily increasing over time.    Patient has not been successful with recent weight loss medication trials.  She has been on regimen without a lot of success.  She admits to stress eating.  She is interested in semaglutide.          Review of Systems  Constitutional, HEENT, cardiovascular, pulmonary, gi and gu systems are negative, except as otherwise noted.      Objective    /54 (BP Location: Right arm, Patient Position: Sitting, Cuff Size: Adult Large)   Pulse 83   Temp 98.5  F (36.9  C) " "(Tympanic)   Resp 16   Ht 1.715 m (5' 7.5\")   Wt 119.9 kg (264 lb 4.8 oz)   SpO2 97%   BMI 40.78 kg/m    Body mass index is 40.78 kg/m .  Physical Exam   General:  Alert and oriented, No acute distress.    Eye: Normal conjunctiva.     HENT:  Oral mucosa is moist.     Neck:  Supple.     Respiratory:  Respirations are non-labored.     Cardiovascular:  Normal rate   Musculoskeletal:  Normal gait.     Psychiatric:  Cooperative, Appropriate mood & affect, Normal judgment.               Signed Electronically by: Greg Rodriguez MD      "

## 2024-11-21 ENCOUNTER — TELEPHONE (OUTPATIENT)
Dept: PHARMACY | Facility: CLINIC | Age: 44
End: 2024-11-21
Payer: COMMERCIAL

## 2024-11-21 NOTE — TELEPHONE ENCOUNTER
MTM referral from: [facility] visit (referral by provider)    MTM referral outreach attempt #2 on November 21, 2024 at 2:52 PM      Outcome: Patient not reachable after several attempts, sent [employer] message    Use [employer]  emp group for the carrier/Plan on the flowsheet      MyChart Message Sent    [facility]mira Rees [facility]  -[facility]  [phone]

## 2025-02-04 ENCOUNTER — TELEPHONE (OUTPATIENT)
Dept: FAMILY MEDICINE | Facility: CLINIC | Age: 45
End: 2025-02-04
Payer: COMMERCIAL

## 2025-02-04 DIAGNOSIS — E66.09 OBESITY DUE TO EXCESS CALORIES WITH SERIOUS COMORBIDITY, UNSPECIFIED CLASS: Primary | ICD-10-CM

## 2025-02-04 NOTE — TELEPHONE ENCOUNTER
Pt is needing refills of Wegovy.   Is currently on the 1 mg dose.  She needs the next 3 months sent to [facility] pharmacy--1.7 mg (34 units), 2 mg (40 units) and 2.5 mg (50 units).   Pt is doing well on medication but does have some nausea.  She is also requesting some Zofran be sent to [facility].   Pt has lost 15 lbs in the past 10 weeks.

## 2025-02-05 RX ORDER — ONDANSETRON 4 MG/1
4 TABLET, ORALLY DISINTEGRATING ORAL EVERY 8 HOURS PRN
Qty: 15 TABLET | Refills: 1 | Status: SHIPPED | OUTPATIENT
Start: 2025-02-05

## 2025-04-16 ENCOUNTER — TELEPHONE (OUTPATIENT)
Dept: FAMILY MEDICINE | Facility: CLINIC | Age: 45
End: 2025-04-16
Payer: COMMERCIAL

## 2025-04-16 NOTE — TELEPHONE ENCOUNTER
Patient Quality Outreach    Patient is due for the following:   Colon Cancer Screening  Cervical Cancer Screening - PAP Needed  Physical Preventive Adult Physical    Action(s) Taken:   Schedule a Adult Preventative    Type of outreach:    Sent [employer] message.    Questions for provider review:    None         Marixa Prince MA  Chart routed to None.

## 2025-04-17 ENCOUNTER — OFFICE VISIT (OUTPATIENT)
Dept: FAMILY MEDICINE | Facility: CLINIC | Age: 45
End: 2025-04-17
Payer: COMMERCIAL

## 2025-04-17 VITALS
SYSTOLIC BLOOD PRESSURE: 103 MMHG | DIASTOLIC BLOOD PRESSURE: 73 MMHG | HEART RATE: 68 BPM | OXYGEN SATURATION: 99 % | TEMPERATURE: 97.6 F | RESPIRATION RATE: 16 BRPM | BODY MASS INDEX: 37.22 KG/M2 | HEIGHT: 68 IN | WEIGHT: 245.6 LBS

## 2025-04-17 DIAGNOSIS — E66.09 OBESITY DUE TO EXCESS CALORIES WITH SERIOUS COMORBIDITY, UNSPECIFIED CLASS: Primary | ICD-10-CM

## 2025-04-17 NOTE — PROGRESS NOTES
"  Assessment & Plan     Obesity due to excess calories with serious comorbidity, unspecified class  She will continue with semaglutide at 2.5 mg weekly.  If no significant improvement in the next month to 6 weeks she will discontinue semaglutide and proceed with tirzepatide.          BMI  Estimated body mass index is 37.9 kg/m  as calculated from the following:    Height as of this encounter: 1.715 m (5' 7.5\").    Weight as of this encounter: 111.4 kg (245 lb 9.6 oz).             Allen Weir is a 45 year old, presenting for the following health issues:  Weight Problem (Follow up wt loss meds)      4/17/2025     2:16 PM   Additional Questions   Roomed by Marixa QUIÑONES CMA   Accompanied by Self     History of Present Illness       Reason for visit:  Med check    She eats 0-1 servings of fruits and vegetables daily.She consumes 1 sweetened beverage(s) daily.She exercises with enough effort to increase her heart rate 20 to 29 minutes per day.  She exercises with enough effort to increase her heart rate 3 or less days per week.   She is taking medications regularly.      Patient is here for follow-up on her weight loss.  She has been increasing semaglutide over the last several months.  She is lost about 25 pounds.  She seems to be plateauing.  She is not near her goal weight.  She has not had any significant side effects from the medication.            Objective    /73 (BP Location: Right arm, Patient Position: Sitting, Cuff Size: Adult Large)   Pulse 68   Temp 97.6  F (36.4  C) (Tympanic)   Resp 16   Ht 1.715 m (5' 7.5\")   Wt 111.4 kg (245 lb 9.6 oz)   LMP 03/18/2025 (Approximate)   SpO2 99%   BMI 37.90 kg/m    Body mass index is 37.9 kg/m .  Physical Exam   General:  Alert and oriented, No acute distress.    Eye: Normal conjunctiva.     HENT:  Oral mucosa is moist.     Neck:  Supple.     Respiratory:  Respirations are non-labored.     Cardiovascular:  Normal rate   Musculoskeletal:  Normal gait.   "   Psychiatric:  Cooperative, Appropriate mood & affect, Normal judgment.               Signed Electronically by: Greg Rodriguez MD

## 2025-04-30 ENCOUNTER — OFFICE VISIT (OUTPATIENT)
Dept: FAMILY MEDICINE | Facility: CLINIC | Age: 45
End: 2025-04-30
Payer: COMMERCIAL

## 2025-04-30 ENCOUNTER — ORDERS ONLY (AUTO-RELEASED) (OUTPATIENT)
Dept: FAMILY MEDICINE | Facility: CLINIC | Age: 45
End: 2025-04-30

## 2025-04-30 VITALS
HEIGHT: 68 IN | TEMPERATURE: 97.1 F | BODY MASS INDEX: 37.27 KG/M2 | SYSTOLIC BLOOD PRESSURE: 113 MMHG | RESPIRATION RATE: 16 BRPM | WEIGHT: 245.9 LBS | OXYGEN SATURATION: 96 % | DIASTOLIC BLOOD PRESSURE: 81 MMHG | HEART RATE: 87 BPM

## 2025-04-30 DIAGNOSIS — Z13.1 SCREENING FOR DIABETES MELLITUS: ICD-10-CM

## 2025-04-30 DIAGNOSIS — Z13.6 SCREENING FOR CARDIOVASCULAR CONDITION: ICD-10-CM

## 2025-04-30 DIAGNOSIS — Z12.11 SCREEN FOR COLON CANCER: ICD-10-CM

## 2025-04-30 DIAGNOSIS — Z12.4 CERVICAL CANCER SCREENING: ICD-10-CM

## 2025-04-30 DIAGNOSIS — Z00.00 HEALTHCARE MAINTENANCE: Primary | ICD-10-CM

## 2025-04-30 DIAGNOSIS — E66.09 OBESITY DUE TO EXCESS CALORIES WITH SERIOUS COMORBIDITY, UNSPECIFIED CLASS: ICD-10-CM

## 2025-04-30 LAB
CHOLEST SERPL-MCNC: 174 MG/DL
FASTING STATUS PATIENT QL REPORTED: ABNORMAL
FASTING STATUS PATIENT QL REPORTED: ABNORMAL
GLUCOSE SERPL-MCNC: 102 MG/DL (ref 70–99)
HDLC SERPL-MCNC: 45 MG/DL
LDLC SERPL CALC-MCNC: 115 MG/DL
NONHDLC SERPL-MCNC: 129 MG/DL
TRIGL SERPL-MCNC: 70 MG/DL

## 2025-04-30 PROCEDURE — 3074F SYST BP LT 130 MM HG: CPT | Performed by: FAMILY MEDICINE

## 2025-04-30 PROCEDURE — 80061 LIPID PANEL: CPT | Performed by: FAMILY MEDICINE

## 2025-04-30 PROCEDURE — G0145 SCR C/V CYTO,THINLAYER,RESCR: HCPCS | Performed by: FAMILY MEDICINE

## 2025-04-30 PROCEDURE — 36415 COLL VENOUS BLD VENIPUNCTURE: CPT | Performed by: FAMILY MEDICINE

## 2025-04-30 PROCEDURE — 87624 HPV HI-RISK TYP POOLED RSLT: CPT | Performed by: FAMILY MEDICINE

## 2025-04-30 PROCEDURE — 99396 PREV VISIT EST AGE 40-64: CPT | Performed by: FAMILY MEDICINE

## 2025-04-30 PROCEDURE — 3079F DIAST BP 80-89 MM HG: CPT | Performed by: FAMILY MEDICINE

## 2025-04-30 PROCEDURE — 82947 ASSAY GLUCOSE BLOOD QUANT: CPT | Performed by: FAMILY MEDICINE

## 2025-04-30 SDOH — HEALTH STABILITY: PHYSICAL HEALTH: ON AVERAGE, HOW MANY DAYS PER WEEK DO YOU ENGAGE IN MODERATE TO STRENUOUS EXERCISE (LIKE A BRISK WALK)?: 4 DAYS

## 2025-04-30 ASSESSMENT — SOCIAL DETERMINANTS OF HEALTH (SDOH): HOW OFTEN DO YOU GET TOGETHER WITH FRIENDS OR RELATIVES?: THREE TIMES A WEEK

## 2025-04-30 NOTE — PROGRESS NOTES
"Preventive Care Visit  [facility]  Greg Rodriguez MD, Family Medicine  Apr 30, 2025      Assessment & Plan     Healthcare maintenance  We have discussed health maintenance, routine follow-up, immunizations, heart healthy diet, regular activity, weight maintenance.    Obesity due to excess calories with serious comorbidity, unspecified class  We have discussed healthy diet, exercise, and ongoing semaglutide management.    Screening for diabetes mellitus  - Glucose; Future    Cervical cancer screening  - HPV and Gynecologic Cytology Panel - Recommended Age 30 - 65 Years    Screen for colon cancer  - COLOGUARD([employer]); Future    Screening for cardiovascular condition  - Lipid panel reflex to direct LDL Non-fasting; Future      Patient has been advised of split billing requirements and indicates understanding: Yes        BMI  Estimated body mass index is 37.94 kg/m  as calculated from the following:    Height as of this encounter: 1.715 m (5' 7.5\").    Weight as of this encounter: 111.5 kg (245 lb 14.4 oz).       Counseling  Appropriate preventive services were addressed with this patient via screening, questionnaire, or discussion as appropriate for fall prevention, nutrition, physical activity, Tobacco-use cessation, social engagement, weight loss and cognition.  Checklist reviewing preventive services available has been given to the patient.  Reviewed patient's diet, addressing concerns and/or questions.           Allen Weir is a 45 year old, presenting for the following:  Physical (Annual px)        4/30/2025     6:52 AM   Additional Questions   Roomed by Marixa QUIÑONES CMA   Accompanied by self          HPI  Patient is here for health maintenance.  She has been doing fairly well.  She continues to struggle with her weight.  She is currently taking semaglutide.  She has not had a lot of success with this.  She is due for colon cancer screening cervical cancer screening, lipid " screening and diabetes screening.  She recently had a mammogram.      Advance Care Planning    Discussed advance care planning with patient; informed AVS has link to [employer] [employer].        4/30/2025   General Health   How would you rate your overall physical health? Good   Feel stress (tense, anxious, or unable to sleep) Only a little   (!) STRESS CONCERN      4/30/2025   Nutrition   Three or more servings of calcium each day? Yes   Diet: Regular (no restrictions)   How many servings of fruit and vegetables per day? (!) 0-1   How many sweetened beverages each day? 0-1         4/30/2025   Exercise   Days per week of moderate/strenous exercise 4 days         4/30/2025   Social Factors   Frequency of gathering with friends or relatives Three times a week   Worry food won't last until get money to buy more No   Food not last or not have enough money for food? No   Do you have housing? (Housing is defined as stable permanent housing and does not include staying outside in a car, in a tent, in an abandoned building, in an overnight shelter, or couch-surfing.) Yes   Are you worried about losing your housing? No   Lack of transportation? No   Unable to get utilities (heat,electricity)? No         4/30/2025   Dental   Dentist two times every year? Yes         Today's PHQ-2 Score:       4/30/2025     6:49 AM   PHQ-2 ( 1999 [employer])   Q1: Little interest or pleasure in doing things 0   Q2: Feeling down, depressed or hopeless 0   PHQ-2 Score 0    Q1: Little interest or pleasure in doing things Not at all   Q2: Feeling down, depressed or hopeless Not at all   PHQ-2 Score 0       Patient-reported           4/30/2025   Substance Use   Alcohol more than 3/day or more than 7/wk No   Do you use any other substances recreationally? No     Social History     Tobacco Use    Smoking status: Never     Passive exposure: Never    Smokeless tobacco: Never   Vaping Use    Vaping status: Never Used           10/7/2024   LAST FHS-7 RESULTS  "  1st degree relative breast or ovarian cancer No   Any relative bilateral breast cancer No   Any male have breast cancer No   Any ONE woman have BOTH breast AND ovarian cancer No   Any woman with breast cancer before 50yrs No   2 or more relatives with breast AND/OR ovarian cancer No   2 or more relatives with breast AND/OR bowel cancer No                  4/30/2025   One time HIV Screening   Previous HIV test? Yes         4/30/2025   STI Screening   New sexual partner(s) since last STI/HIV test? No     History of abnormal Pap smear: No - age 30- 64 PAP with HPV every 5 years recommended       ASCVD Risk   The ASCVD Risk score (Antonieta MILLER, et al., 2019) failed to calculate for the following reasons:    Cannot find a previous HDL lab    Cannot find a previous total cholesterol lab        4/30/2025   Contraception/Family Planning   Questions about contraception or family planning No        Reviewed and updated as needed this visit by Provider                    No past medical history on file.  No past surgical history on file.  OB History   No obstetric history on file.     Lab work is in process  BP Readings from Last 3 Encounters:   04/30/25 113/81   04/17/25 103/73   11/13/24 111/54    Wt Readings from Last 3 Encounters:   04/30/25 111.5 kg (245 lb 14.4 oz)   04/17/25 111.4 kg (245 lb 9.6 oz)   11/13/24 119.9 kg (264 lb 4.8 oz)                      Review of Systems  Constitutional, HEENT, cardiovascular, pulmonary, gi and gu systems are negative, except as otherwise noted.     Objective    Exam  /81 (BP Location: Right arm, Patient Position: Sitting, Cuff Size: Adult Large)   Pulse 87   Temp 97.1  F (36.2  C) (Tympanic)   Resp 16   Ht 1.715 m (5' 7.5\")   Wt 111.5 kg (245 lb 14.4 oz)   LMP 04/23/2025 (Exact Date)   SpO2 96%   BMI 37.94 kg/m     Estimated body mass index is 37.94 kg/m  as calculated from the following:    Height as of this encounter: 1.715 m (5' 7.5\").    Weight as of this " encounter: 111.5 kg (245 lb 14.4 oz).    Physical Exam  GENERAL: alert and no distress  EYES: Eyes grossly normal to inspection, PERRL and conjunctivae and sclerae normal  HENT: ear canals and TM's normal, nose and mouth without ulcers or lesions  NECK: no adenopathy, no asymmetry, masses, or scars  RESP: lungs clear to auscultation - no rales, rhonchi or wheezes  CV: regular rate and rhythm, normal S1 S2, no S3 or S4, no murmur, click or rub, no peripheral edema  ABDOMEN: soft, nontender, no hepatosplenomegaly, no masses and bowel sounds normal   (female): normal female external genitalia, normal urethral meatus, normal vaginal mucosa, cervix without lesion  MS: no gross musculoskeletal defects noted, no edema  SKIN: no suspicious lesions or rashes and there is an inflamed, erythematous, tender nodule in left axilla, suspect inflamed lymph node  NEURO: Normal strength and tone, mentation intact and speech normal  PSYCH: mentation appears normal, affect normal/bright        Signed Electronically by: Greg Rodriguez MD

## 2025-05-05 LAB
BKR AP ASSOCIATED HPV REPORT: NORMAL
BKR LAB AP GYN ADEQUACY: NORMAL
BKR LAB AP GYN INTERPRETATION: NORMAL
BKR LAB AP GYN OTHER FINDINGS: NORMAL
BKR LAB AP LMP: NORMAL
BKR LAB AP PREVIOUS ABNORMAL: NORMAL
PATH REPORT.COMMENTS IMP SPEC: NORMAL
PATH REPORT.COMMENTS IMP SPEC: NORMAL
PATH REPORT.RELEVANT HX SPEC: NORMAL

## 2025-05-07 ENCOUNTER — RESULTS FOLLOW-UP (OUTPATIENT)
Dept: OBGYN | Facility: CLINIC | Age: 45
End: 2025-05-07

## 2025-05-14 ENCOUNTER — OFFICE VISIT (OUTPATIENT)
Dept: FAMILY MEDICINE | Facility: CLINIC | Age: 45
End: 2025-05-14
Payer: COMMERCIAL

## 2025-05-14 VITALS
DIASTOLIC BLOOD PRESSURE: 81 MMHG | OXYGEN SATURATION: 99 % | WEIGHT: 243 LBS | HEIGHT: 68 IN | RESPIRATION RATE: 16 BRPM | HEART RATE: 78 BPM | TEMPERATURE: 98.4 F | SYSTOLIC BLOOD PRESSURE: 117 MMHG | BODY MASS INDEX: 36.83 KG/M2

## 2025-05-14 DIAGNOSIS — R87.619 ENDOMETRIAL CELLS ON CERVICAL PAP SMEAR INCONSISTENT W/LMP: Primary | ICD-10-CM

## 2025-05-14 PROCEDURE — 3074F SYST BP LT 130 MM HG: CPT | Performed by: FAMILY MEDICINE

## 2025-05-14 PROCEDURE — 3079F DIAST BP 80-89 MM HG: CPT | Performed by: FAMILY MEDICINE

## 2025-05-14 PROCEDURE — 88305 TISSUE EXAM BY PATHOLOGIST: CPT | Mod: TC | Performed by: FAMILY MEDICINE

## 2025-05-14 PROCEDURE — 57455 BIOPSY OF CERVIX W/SCOPE: CPT | Performed by: FAMILY MEDICINE

## 2025-05-14 NOTE — PROGRESS NOTES
"  Assessment & Plan     Endometrial cells on cervical Pap smear inconsistent w/LMP  Informed consent obtained.  Sterile speculum was placed, and cervix viewed.  The anterior lip of the cervix was grasped with a single tooth tenaculum and uterus was sounded to 9 cm.  A Pipelle suction curette was introduced through the cervix and an adequate sample was obtained with two passes.  She tolerated the procedure well.            BMI  Estimated body mass index is 37.5 kg/m  as calculated from the following:    Height as of this encounter: 1.715 m (5' 7.5\").    Weight as of this encounter: 110.2 kg (243 lb).             Allen Weir is a 45 year old, presenting for the following health issues:  Follow Up (Endometrial biopsy)      5/14/2025     2:15 PM   Additional Questions   Roomed by Marixa QUIÑONES CMA   Accompanied by self     History of Present Illness       Reason for visit:  Biopsy   She is taking medications regularly.        Patient is here for endometrial biopsy.  She had endometrial cells on recent Pap smear.  She was several days past her last menstrual period.  She also has a history of mild menorrhagia              Objective    /81 (BP Location: Right arm, Patient Position: Sitting, Cuff Size: Adult Large)   Pulse 78   Temp 98.4  F (36.9  C) (Tympanic)   Resp 16   Ht 1.715 m (5' 7.5\")   Wt 110.2 kg (243 lb)   LMP 04/23/2025 (Exact Date)   SpO2 99%   BMI 37.50 kg/m    Body mass index is 37.5 kg/m .  Physical Exam   GENERAL: alert and no distress   (female) w/bimanual: normal female external genitalia, normal urethral meatus, normal vaginal mucosa, and normal cervix/adnexa/uterus without masses or discharge  Uterus is mildly enlarged, non tender            Signed Electronically by: Greg Rodriguez MD    "

## 2025-05-16 PROCEDURE — 88305 TISSUE EXAM BY PATHOLOGIST: CPT | Mod: 26 | Performed by: PATHOLOGY

## 2025-05-19 ENCOUNTER — RESULTS FOLLOW-UP (OUTPATIENT)
Dept: FAMILY MEDICINE | Facility: CLINIC | Age: 45
End: 2025-05-19

## 2025-06-23 DIAGNOSIS — E66.09 OBESITY DUE TO EXCESS CALORIES WITH SERIOUS COMORBIDITY, UNSPECIFIED CLASS: ICD-10-CM
